# Patient Record
(demographics unavailable — no encounter records)

---

## 2020-01-01 NOTE — DISCHARGE SUMMARY
Hospital Course





- Hospital Course


Day of Life: 3


Current Weight: 3.360kg


% weight change from BW: pending new weight


Billirubin Level: 7.5, 10.6, phototherapy, then dc'd, rebound 7.1.


Phototherapy: No


Vitamin K: Yes


Hepatitis B: Yes


Other: Feeding well, Voiding well, Adequate stools


CCHD Screen: Pass


Hearing Screen: Pending


Car Seat test: No





[facility] Documentation





- Patient Data


Date of Birth: 20


Discharge Date: 02/15/20





- Maternal Info


Infant Delivery Method: Primary  Section


Operative Indications ( Section): Fetal Distress


 Feeding Method: Bottle


Prenatal Events: Polyhydramnios


Maternal Blood Type: O (+) positive (infant B+; leona negative)


HbsAg: Negative


HIV: Negative


RPR/VDRL: Non-reactive


Chlamydia: Negative


Gonorrhea: Negative


Herpes: Positive (on valtrex;no active lesions reported)


Group Beta Strep: Negative


Rubella: Immune


Amniotic Membrane Rupture Date: 20


Amniotic Membrane Rupture Time: 17:30





- Birth


Birth information: 








Delivery Date                    20


Delivery Time                    22:29


1 Minute Apgar                   8


5 Minute Apgar                   9


Gestational Age                  37.4


Birthweight                      3.36 kg


Height                           48.26 cm


 Head Circumference       34


[place] Chest Circumference      32.5


Abdominal Girth                  30











Exam


                                   Vital Signs











Temp Pulse Resp


 


 98.1 F   123   54 


 


 20 22:38  20 22:38  20 22:38








                                        











Temp Pulse Resp BP Pulse Ox


 


 98.3 F   134   44       


 


 02/15/20 08:39  02/15/20 08:39  02/15/20 08:39      














- General Appearance


General appearance: Positive: AGA, color consistent with genetic background, 

alert state appropriate, strong cry, flexed posture





- Constitutional


normal weight





- Skin


Positive: intact





- HEENT


Head: normocephalic, symmetrical movement


Fontanel: Positive: dulce shaped anterior 3x2 cm, soft, flat


Eyes: Positive: CRISTIAN, clear, symmetrical, EOM normal, tracks to midline, red 

reflex, sclera genetically appropriate


Pupils: bilateral: normal





- Nose


Nose: Positive: normal, patent, symmetrical, midline.  Negative: flaring


Nasal septum: Positive: normal position





- Ears


Canals: normal


Tympanic membranes: Normal


Auricles: normal





- Mouth


Mouth/tongue: symmetry of movement, palate intact, suck/swallow coordinated


Lips: normal


Oropharynx: normal





- Throat/Neck


Throat/Neck: normal position, no masses, gag reflex, symmetrical shoulders, 

clavicle intact, thyroid normal





- Chest/Lungs


Inspection: symmetric, normal expansion


Auscultation: clear and equal





- Cardiovascular


Femoral pulse/perfusion: equal bilaterally, capillary refill <3 sec., normal


Cardiovascular: regular rate, regular rhythm, S1 (normal), S2 (normal), no 

murmur


Transmission: none


Precordial activity: normal





- Gastrointestinal


Positive: cylindrical, soft, normal BS, 3 vessel cord apparent.  Negative: 

palpable mass, distended, hernia





- Genitourinary


Genitalia: gender clearly delineated


Genitourinary: labia majora covers labia minora, urinary meatus visible, vaginal

orifice visible


Buttocks/rectum/anus: Positive: symmetrical, anus patent, normal tone.  

Negative: fissure, skin tags





- Musculoskeletal


Spine: Positive: flat and straight when prone


Musculoskeletal: Positive: normal, symmetrical, legs equal length.  Negative: 

extra digits, hip click





- Neurological


Positive: symmetrical movement, strength/tone in all extremities





- Reflexes


Reflexes: reflexes normal, patrick, suck, plantar, palmar, grasp, stepping, tonic 

neck, fencing, other





Disposition





- Disposition


Discharge Home With: Mother





- Discharge Teaching


Discharge Teaching: Reviewed Safe sleeping, feeding, and output parameters, 

Signs and symptoms of illness, Appropriate follow-up for infant, Mother 

verbalized understanding and all questions were answered





- Discharge Instruction


Discharge Instructions: Follow up with your PCP 24-48 hours following discharge,

Breast feed as needed on demand, Supplement with as needed every 3-4 hours with 

formula, Do not let your baby sleep for > 4 hours without feeding


Notify Doctor Immediately if:: Vomiting and diarrhea, Yellowing of the skin 

(jaundice), Excessive crying or irritability, Fever more than 100.4, Lethargy or

difficulty awakening

## 2020-01-01 NOTE — HISTORY AND PHYSICAL REPORT
History of Present Illness


Date of examination: 20


Date of admission: 


20 22:29





Chief complaint: 





[place] 


History of present illness: 





Term infant born to a 39YO  mother via primary CS for fetal distress.  

Pregnancy complicated by GDM and polyhydramnios, resolved, and CHTN, not on meds

during pregnancy.  Mother with 1 kidney due to congenital defect.  





[place] Documentation





- Patient Data


Date of Birth: 20





- Maternal Info


Infant Delivery Method: Primary  Section


Operative Indications ( Section): Fetal Distress


Prenatal Events: Polyhydramnios


Maternal Blood Type: O (+) positive (infant B+; leona negative)


HbsAg: Negative


HIV: Negative


RPR/VDRL: Non-reactive


Chlamydia: Negative


Gonorrhea: Negative


Herpes: Positive (on valtrex;no active lesions reported)


Group Beta Strep: Negative


Rubella: Immune


Amniotic Membrane Rupture Date: 20


Amniotic Membrane Rupture Time: 17:30





- Birth


Birth information: 








Delivery Date                    20


Delivery Time                    22:29


1 Minute Apgar                   8


5 Minute Apgar                   9


Gestational Age                  37.4


Birthweight                      3.36 kg


Height                           19 in


 Head Circumference       34


[place] Chest Circumference      32.5


Abdominal Girth                  30











Exam


                                   Vital Signs











Temp Pulse Resp


 


 98.1 F   123   54 


 


 20 22:38  20 22:38  20 22:38








                                        











Temp Pulse Resp BP Pulse Ox


 


 98.7 F   144   48       


 


 20 14:00  20 14:00  20 14:00      














- General Appearance


General appearance: Positive: AGA, color consistent with genetic background, 

alert state appropriate, strong cry, flexed posture





- Constitutional


normal weight





- Skin


Positive: intact, other ([place] spots on buttock, shoulders, and back)





- HEENT


Head: normocephalic, symmetrical movement, overlapping cranial bone


Fontanel: Positive: soft


Eyes: Positive: CRISTIAN, clear, symmetrical, EOM normal, red reflex, sclera 

genetically appropriate


Pupils: bilateral: normal





- Nose


Nose: Positive: normal, patent, symmetrical, midline.  Negative: flaring


Nasal septum: Positive: normal position





- Ears


Canals: normal


Tympanic membranes: Normal


Auricles: normal





- Mouth


Mouth/tongue: symmetry of movement, palate intact, suck/swallow coordinated


Lips: normal


Oral mucosa: erythematous, erythematous gums


Oropharynx: normal





- Throat/Neck


Throat/Neck: normal position, no masses, gag reflex, symmetrical shoulders, 

clavicle intact





- Chest/Lungs


Inspection: symmetric, normal expansion


Auscultation: clear and equal





- Cardiovascular


Femoral pulse/perfusion: equal bilaterally, capillary refill <3 sec., normal


Cardiovascular: regular rate, regular rhythm, S1 (normal), S2 (normal), no 

murmur


Transmission: none


Precordial activity: normal





- Gastrointestinal


Positive: cylindrical, soft, normal BS, 3 vessel cord apparent.  Negative: 

palpable mass, distended, hernia





- Genitourinary


Genitalia: gender clearly delineated


Genitourinary: labia majora covers labia minora, urinary meatus visible, vaginal

orifice visible


Buttocks/rectum/anus: Positive: symmetrical, anus patent, normal tone.  

Negative: fissure, skin tags





- Musculoskeletal


Spine: Positive: flat and straight when prone


Musculoskeletal: Positive: normal, symmetrical, legs equal length, extra digits 

(left hand postaxial polydactyly ligated).  Negative: hip click





- Neurological


Positive: symmetrical movement, strength/tone in all extremities, other (alert 

and active )





- Reflexes


Reflexes: reflexes normal, patrick, suck, plantar, palmar, grasp, stepping, tonic 

neck, fencing





Results





- Laboratory Findings


                              Abnormal lab results











  20 Range/Units





  00:07 01:51 04:38 


 


POC Glucose  45 L  64 L  65 L  ()  














Assessment/Plan





- Patient Problems


(1) Liveborn infant by  delivery


Current Visit: Yes   Status: Acute   





(2) Postaxial polydactyly of left hand


Current Visit: Yes   Status: Acute   





(3) IDM (infant of diabetic mother)


Current Visit: Yes   Status: Acute   





A/P Cont'd





- Assessment


Assessment: Term  infant, Infant of diabetic mother


Nutrition: Formula feeding


Plan: Routine  care, Monitor intake and output per protocol, Monitor 

bilirubin per procotol, Monitor glucose per protocol





- Discharge Instructions


May discharge home w/ mother after (24/48) hours of life if:: Vital signs are 

within normal parameters, Baby is breast or bottle-feeding per lactation or RN 

assessment, Baby has had at least 2 voids and 1 stool, Baby passes CCHD 

screening, Bilirubin is in the low risk or intermediate risk zone, If infant 

fails hearing screen order CM consult for "[facility]"





Provider Discharge Summary





- Provider Discharge Summary





- Follow-Up Plan


Follow up with: 


TEVIN MURILLO MD [Primary Care Provider] - 7 Days

## 2020-01-01 NOTE — PROCEDURE NOTE
Pediatric - EDL





- Procedure


Procedure: Extra digit ligation


Time Out Completed: Yes


Indication: postaxial polydactyly of left hand





- Description


Extra Digit Ligation: 


After parental consent, the site was cleaned thoroughly, and the extra digit was

ligated at it's base using suture material. Baby tolerated procedure well. 





Complications: No

## 2020-01-01 NOTE — PROGRESS NOTE
Hospital Course





- Hospital Course


Day of Life: 3


Current Weight: 3.360kg


% weight change from BW: pending new weight


Billirubin Level: 7.5 TsB at 24 HOL (high intermediate)


Phototherapy: No


Vitamin K: Yes


Hepatitis B: Yes


Other: Feeding well, Voiding well, Adequate stools


CCHD Screen: Pass


Hearing Screen: Pending


Car Seat test: No





Exam


                                   Vital Signs











Temp Pulse Resp


 


 98.1 F   123   54 


 


 20 22:38  20 22:38  20 22:38








                                        











Temp Pulse Resp BP Pulse Ox


 


 98.6 F   138   40       


 


 20 08:46  20 08:46  20 08:46      














- General Appearance


General appearance: Positive: AGA, color consistent with genetic background, 

alert state appropriate, strong cry, flexed posture





- Constitutional


normal weight





- Skin


Positive: intact, other ([place] spots)





- HEENT


Head: normocephalic, symmetrical movement, molding, overlapping cranial bone


Fontanel: Positive: soft, flat


Eyes: Positive: CRISTIAN, clear, symmetrical, EOM normal, tracks to midline, red 

reflex, sclera genetically appropriate


Pupils: bilateral: normal





- Nose


Nose: Positive: normal, patent, symmetrical, midline.  Negative: flaring


Nasal septum: Positive: normal position





- Ears


Auricles: normal





- Mouth


Mouth/tongue: symmetry of movement, palate intact, suck/swallow coordinated


Lips: normal


Oropharynx: normal





- Throat/Neck


Throat/Neck: normal position, no masses, gag reflex, symmetrical shoulders, 

clavicle intact





- Chest/Lungs


Inspection: symmetric, normal expansion


Auscultation: clear and equal





- Cardiovascular


Femoral pulse/perfusion: equal bilaterally, capillary refill <3 sec., normal


Cardiovascular: regular rate, regular rhythm, S1 (normal), S2 (normal), no 

murmur


Transmission: none


Precordial activity: normal





- Gastrointestinal


Positive: cylindrical, soft, normal BS, 3 vessel cord apparent.  Negative: 

palpable mass, distended, hernia





- Genitourinary


Genitalia: gender clearly delineated


Genitourinary: labia majora covers labia minora, urinary meatus visible, vaginal

orifice visible


Buttocks/rectum/anus: Positive: symmetrical, anus patent, normal tone.  

Negative: fissure, skin tags





- Musculoskeletal


Spine: Positive: flat and straight when prone


Musculoskeletal: Positive: normal, symmetrical, legs equal length, extra digits 

(extra digits left hand ligated and discolored ).  Negative: hip click





- Neurological


Positive: symmetrical movement, strength/tone in all extremities





- Reflexes


Reflexes: reflexes normal





Results





- Laboratory Findings


                              Abnormal lab results











  20 Range/Units





  23:10 


 


Total Bilirubin  7.50 H  (0.1-1.2)  mg/dL


 


Direct Bilirubin  0.3 H  (0-0.2)  mg/dL














Assessment/Plan





- Patient Problems


(1) IDM (infant of diabetic mother)


Current Visit: Yes   Status: Acute   





(2) Liveborn infant by  delivery


Current Visit: Yes   Status: Acute   





(3) Postaxial polydactyly of left hand


Current Visit: Yes   Status: Acute   





A/P Cont'd





- Assessment


Assessment: Term  infant


Nutrition: Formula feeding


Plan: Routine  care, Monitor intake and output per protocol, Monitor 

bilirubin per procotol, Monitor glucose per protocol